# Patient Record
Sex: FEMALE | Race: WHITE
[De-identification: names, ages, dates, MRNs, and addresses within clinical notes are randomized per-mention and may not be internally consistent; named-entity substitution may affect disease eponyms.]

---

## 2017-08-22 ENCOUNTER — HOSPITAL ENCOUNTER (EMERGENCY)
Dept: HOSPITAL 25 - UCEAST | Age: 27
Discharge: HOME | End: 2017-08-22
Payer: COMMERCIAL

## 2017-08-22 VITALS — SYSTOLIC BLOOD PRESSURE: 87 MMHG | DIASTOLIC BLOOD PRESSURE: 58 MMHG
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
## 2017-08-22 DIAGNOSIS — S61.011A: Primary | ICD-10-CM

## 2017-08-22 PROCEDURE — 99202 OFFICE O/P NEW SF 15 MIN: CPT

## 2017-08-22 PROCEDURE — G0463 HOSPITAL OUTPT CLINIC VISIT: HCPCS

## 2017-08-22 NOTE — UC
Laceration HPI





- HPI Summary


HPI Summary: 





MAKING DINNER TONIGHT AND CUT TIP OF RIGHT THUMB ON A CAN. LAST TETANUS 2016.





- History Of Current Complaint


Chief Complaint: UCLaceration


Stated Complaint: THUMB LAC


Time Seen by Provider: 08/22/17 20:45


Hx Obtained From: Patient


Hx Last Menstrual Period: 1 WEEK AGO


Laceration Location: Finger - RIGHT THUMB


Mechanism Of Injury: Sharp Trauma


Onset/Duration: Sudden Onset, Lasting Hours, Still Present


Severity: Moderate


Pain Intensity: 4


Pain Scale Used: 0-10 Numeric


Aggravating Factors: Nothing


Related History: Dominant Hand Right





- Allergies/Home Medications


Allergies/Adverse Reactions: 


 Allergies











Allergy/AdvReac Type Severity Reaction Status Date / Time


 


No Known Allergies Allergy   Verified 08/22/17 20:45











Home Medications: 


 Home Medications





Sertraline* [Zoloft*] 50 mg PO DAILY 08/22/17 [History Confirmed 08/22/17]











PMH/Surg Hx/FS Hx/Imm Hx


Previously Healthy: Yes





- Surgical History


Surgical History: None





- Family History


Known Family History: 


   Negative: Hypertension, Diabetes





- Social History


Alcohol Use: Occasionally


Substance Use Type: None


Smoking Status (MU): Never Smoked Tobacco





- Immunization History


Most Recent Tetanus Shot: <5 YEARS (AS OF 8/22/17)





Review of Systems


Constitutional: Negative


Skin: Other - LACERATION RIGHT THUMB


Respiratory: Negative


Cardiovascular: Negative


Gastrointestinal: Negative


All Other Systems Reviewed And Are Negative: Yes





Physical Exam


Triage Information Reviewed: Yes


Appearance: Well-Appearing, No Pain Distress, Well-Nourished


Vital Signs: 


 Initial Vital Signs











Temp  98.3 F   08/22/17 20:42


 


Pulse  95   08/22/17 20:42


 


Resp  16   08/22/17 20:42


 


BP  87/58   08/22/17 20:42











Vital Signs Reviewed: Yes


Eyes: Positive: Conjunctiva Clear


ENT: Positive: Hearing grossly normal


Neck: Positive: Supple


Respiratory: Positive: No respiratory distress, No accessory muscle use


Cardiovascular: Positive: Pulses Normal


Abdomen Description: Positive: Soft


Musculoskeletal: Positive: ROM Intact, No Edema


Neurological: Positive: Alert


Psychological: Positive: Age Appropriate Behavior


Skin: Positive: Other - 1.3CM LINEAR LACERATION TIP OF RIGHT THUMB.  Negative: 

rashes





Laceration Repair





- Laceration Repair


  ** 1


Description: Linear


Laceration Size After Repair: Length (cm) - 1.3CM, Width (mm) - 1MM, Depth (mm) 

- 1MM


Modified For Repair: No


Irrigation With Pressure Irrigation Device: Yes


Closure Material: Skin Adhesive, SteriStrips





Laceration Course/Dx





- Differential Dx - Laceration/Wound


Provider Diagnoses: LACERATION REPAIR RIGHT THUMB - STERISTRIPS AND GLUE





Discharge





- Discharge Plan


Condition: Stable


Disposition: HOME


Patient Education Materials:  Laceration (ED), Steristrips (ED)


Referrals: 


Darline Shane NP [Nurse Practitioner] - If Needed


Additional Instructions: 


SEEK FOLLOW-UP IF YOU DEVELOP SPREADING REDNESS OF THE SKIN, PURULENT DRAINAGE, 

FEVER, INCREASED PAIN OR ANY OTHER CONCERNING SYMPTOMS.





THE STERISTRIPS WILL FALL OFF ON THEIR OWN IN THE NEXT 1-2 WEEKS. DO NOT PUT 

ANY OINTMENT ON TOP OF THEM. DO NOT SUBMERGE IN WATER FOR PROLONGED PERIOD OF 

TIME. OKAY FOR BRIEF SHOWER AFTER 24 HOURS AND THEN BE SURE TO ALLOW TO DRY 

COMPLETELY.

## 2018-10-02 ENCOUNTER — HOSPITAL ENCOUNTER (EMERGENCY)
Dept: HOSPITAL 25 - UCEAST | Age: 28
Discharge: HOME | End: 2018-10-02
Payer: COMMERCIAL

## 2018-10-02 VITALS — SYSTOLIC BLOOD PRESSURE: 98 MMHG | DIASTOLIC BLOOD PRESSURE: 66 MMHG

## 2018-10-02 DIAGNOSIS — J02.9: Primary | ICD-10-CM

## 2018-10-02 DIAGNOSIS — R05: ICD-10-CM

## 2018-10-02 PROCEDURE — 99212 OFFICE O/P EST SF 10 MIN: CPT

## 2018-10-02 PROCEDURE — G0463 HOSPITAL OUTPT CLINIC VISIT: HCPCS

## 2018-10-02 NOTE — UC
Respiratory Complaint HPI





- HPI Summary


HPI Summary: 





29 yo female presents with sore throat, sneezing, and dry cough for the last 4 

days. She has been taking mucinex OTC with no relief. Denies fever, chills, 

sinus symptoms, SOB, chest pain.





- History of Current Complaint


Chief Complaint: UCRespiratory


Stated Complaint: COUGH,FLU SYMPTOMS


Time Seen by Provider: 10/02/18 19:58


Hx Obtained From: Patient


Hx Last Menstrual Period: 9/22/18


Onset/Duration: Gradual Onset


Severity Initially: Mild


Severity Currently: Mild


Pain Intensity: 2


Pain Scale Used: 0-10 Numeric





- Allergies/Home Medications


Allergies/Adverse Reactions: 


 Allergies











Allergy/AdvReac Type Severity Reaction Status Date / Time


 


No Known Allergies Allergy   Verified 10/02/18 19:50











Home Medications: 


 Home Medications





guaiFENesin [Mucinex] 200 mg PO Q8HR 10/02/18 [History Confirmed 10/02/18]











PMH/Surg Hx/FS Hx/Imm Hx


Psychological History: Anxiety, Depression





- Surgical History


Surgical History: None





- Family History


Known Family History: 


   Negative: Hypertension, Diabetes





- Social History


Alcohol Use: Rare


Substance Use Type: None


Smoking Status (MU): Never Smoked Tobacco





- Immunization History


Most Recent Tetanus Shot: <5 YEARS (AS OF 8/22/17)





Review of Systems


Constitutional: Negative


Skin: Negative


Eyes: Negative


ENT: Sore Throat


Respiratory: Cough


Cardiovascular: Negative


Gastrointestinal: Negative


Neurovascular: Negative


Neurological: Negative


Psychological: Negative


All Other Systems Reviewed And Are Negative: Yes





Physical Exam





- Summary


Physical Exam Summary: 





GENERAL: NAD. WDWN. No pain distress.


SKIN: No rashes, sores, lesions, or open wounds.


HEENT:


            Head: AT/NC


            Eyes:  EOM intact. Conjunctiva clear without inflammation or 

discharge.


            Ears: Hearing grossly normal. TMs intact, no bulging, erythema, or 

edema. 


            Nose: Nasal mucosa pink and moist. NTTP maxillary and frontal 

sinus. 


            Throat: Posterior oropharynx without exudates, erythema, or 

tonsillar enlargement.  Uvula midline.


NECK: Supple. Nontender. No lymphadenopathy. 


CHEST:  CTAB. No r/r/w. No accessory muscle use. Breathing comfortably and in 

no distress.


CV:  RRR. Without m/r/g. Pulses intact. Cap refill <2seconds


NEURO: Alert. 


PSYCH: Age appropriate behavior.


Triage Information Reviewed: Yes


Vital Signs: 


 Initial Vital Signs











Temp  98.2 F   10/02/18 19:46


 


Pulse  86   10/02/18 19:46


 


Resp  12   10/02/18 19:46


 


BP  98/66   10/02/18 19:46


 


Pulse Ox  100   10/02/18 19:46











Vital Signs Reviewed: Yes





UC Diagnostic Evaluation





- Laboratory


O2 Sat by Pulse Oximetry: 100





Respiratory Course/Dx





- Course


Course Of Treatment: Discussed with pt that her symptoms are likely due to a 

viral illness and antibiotics are likely not to provide any benefit. She still 

prefers to be on an antibiotic.





- Differential Dx/Diagnosis


Provider Diagnoses: Cough.  Sore throat





Discharge





- Sign-Out/Discharge


Documenting (check all that apply): Patient Departure


All imaging exams completed and their final reports reviewed: No Studies





- Discharge Plan


Condition: Stable


Disposition: HOME


Prescriptions: 


Amoxicillin PO (*) [Amoxicillin 500 MG CAP*] 500 mg PO Q12H #14 cap


Patient Education Materials:  Pharyngitis (ED)


Forms:  *Work Release


Referrals: 


No Primary Care Phys,NOPCP [Primary Care Provider] - 


Additional Instructions: 


If you develop a fever, shortness of breath, chest pain, new or worsening 

symptoms - please call your PCP or go to the ED.


 








- Billing Disposition and Condition


Condition: STABLE


Disposition: Home





- Attestation Statements


Provider Attestation: 





Per institutional requirements, I have reviewed the chart, however, I was not 

consulted specifically or made aware of this patient by the  midlevel provider.

  I did not personally evaluate, interact with , or disposition  this patient.